# Patient Record
Sex: MALE | Race: WHITE | NOT HISPANIC OR LATINO | Employment: OTHER | URBAN - NONMETROPOLITAN AREA
[De-identification: names, ages, dates, MRNs, and addresses within clinical notes are randomized per-mention and may not be internally consistent; named-entity substitution may affect disease eponyms.]

---

## 2017-02-21 ENCOUNTER — OFFICE VISIT (OUTPATIENT)
Dept: OPHTHALMOLOGY | Facility: CLINIC | Age: 72
End: 2017-02-21

## 2017-02-21 DIAGNOSIS — IMO0002 NUCLEAR CATARACT, RIGHT: Primary | ICD-10-CM

## 2017-02-21 DIAGNOSIS — Z96.1 PSEUDOPHAKIA: ICD-10-CM

## 2017-02-21 DIAGNOSIS — H35.3190 NONEXUDATIVE AGE-RELATED MACULAR DEGENERATION: ICD-10-CM

## 2017-02-21 PROCEDURE — 92004 COMPRE OPH EXAM NEW PT 1/>: CPT | Performed by: OPHTHALMOLOGY

## 2017-02-21 RX ORDER — GABAPENTIN 300 MG/1
CAPSULE ORAL
COMMUNITY
Start: 2017-01-27 | End: 2017-04-24

## 2017-02-21 RX ORDER — HYDROCODONE BITARTRATE AND ACETAMINOPHEN 5; 325 MG/1; MG/1
TABLET ORAL
COMMUNITY
Start: 2017-01-07 | End: 2017-04-24 | Stop reason: ALTCHOICE

## 2017-02-21 RX ORDER — CYCLOBENZAPRINE HCL 5 MG
TABLET ORAL
COMMUNITY
Start: 2017-01-07 | End: 2017-04-24 | Stop reason: DRUGHIGH

## 2017-02-21 RX ORDER — MONTELUKAST SODIUM 10 MG/1
10 TABLET ORAL NIGHTLY
COMMUNITY
Start: 2017-02-13

## 2017-02-21 RX ORDER — HYDROCHLOROTHIAZIDE 25 MG/1
25 TABLET ORAL DAILY
COMMUNITY
Start: 2017-02-13

## 2017-02-21 RX ORDER — TESTOSTERONE CYPIONATE 200 MG/ML
INJECTION, SOLUTION INTRAMUSCULAR
Refills: 5 | Status: ON HOLD | COMMUNITY
Start: 2017-01-18 | End: 2017-05-05 | Stop reason: SDUPTHER

## 2017-02-21 RX ORDER — CYANOCOBALAMIN 1000 UG/ML
INJECTION, SOLUTION INTRAMUSCULAR; SUBCUTANEOUS
Refills: 5 | Status: ON HOLD | COMMUNITY
Start: 2017-02-01 | End: 2017-05-05 | Stop reason: SDUPTHER

## 2017-02-21 RX ORDER — WARFARIN SODIUM 5 MG/1
TABLET ORAL
COMMUNITY
Start: 2017-01-04 | End: 2017-04-24

## 2017-02-21 RX ORDER — LISINOPRIL 5 MG/1
TABLET ORAL
COMMUNITY
Start: 2017-02-13 | End: 2017-04-24

## 2017-02-21 RX ORDER — ATORVASTATIN CALCIUM 40 MG/1
20 TABLET, FILM COATED ORAL NIGHTLY
COMMUNITY
Start: 2017-02-11

## 2017-02-21 NOTE — PROGRESS NOTES
Subjective   John Salas is a 71 y.o. male.   Chief Complaint   Patient presents with   • Eye Exam   • Glaucoma     HPI     Eye Exam   Laterality: both eyes   Quality: blurry vision   Severity: severe           Comments   Hx of decr vision OD for years, ? Glaucoma; CE OS 2015       Last edited by Girma Parish MD on 2/21/2017 11:36 AM. (History)          Review of Systems    Objective   Visual Acuity (Snellen - Linear)      Right Left   Dist cc 20/400 20/25   Near cc  J10       Correction:  Glasses         Wearing Rx      Sphere Cylinder Axis Add   Right -0.50 +1.25 007 +2.50   Left -0.25 +1.25 176 +2.50           Manifest Refraction      Sphere   Right ni   Left ni               Pupils      Pupils   Right PERRL   Left PERRL           Confrontational Visual Fields     Visual Fields      Left Right   Result Full Full                  Extraocular Movement      Right Left   Result Full Full              Tonometry (Applanation, 10:51 AM)      Right Left   Pressure 16 14              Main Ophthalmology Exam     External Exam      Right Left    External Normal Normal      Slit Lamp Exam      Right Left    Lids/Lashes Normal Normal    Conjunctiva/Sclera White and quiet White and quiet    Cornea Clear Clear    Anterior Chamber Deep and quiet Deep and quiet    Iris Round and reactive Round and reactive    Lens 3+ Nuclear sclerosis Posterior chamber intraocular lens    Vitreous Normal Normal      Fundus Exam      Right Left    Disc Normal Normal    Macula Early age related macular degeneration; diff view Early age related macular degeneration    Vessels Normal Normal    Periphery Normal Normal                Assessment/Plan   Diagnoses and all orders for this visit:    Nuclear cataract, right    Nonexudative age-related macular degeneration    Pseudophakia    discussed options, pt desires CE OD, will sched Dr Wagner's eval     copyt to [facility]

## 2017-04-11 ENCOUNTER — PREP FOR SURGERY (OUTPATIENT)
Dept: OPHTHALMOLOGY | Facility: CLINIC | Age: 72
End: 2017-04-11

## 2017-04-11 ENCOUNTER — OFFICE VISIT (OUTPATIENT)
Dept: OPHTHALMOLOGY | Facility: CLINIC | Age: 72
End: 2017-04-11

## 2017-04-11 DIAGNOSIS — Z96.1 PSEUDOPHAKIA: ICD-10-CM

## 2017-04-11 DIAGNOSIS — H26.9 CATARACT: Primary | ICD-10-CM

## 2017-04-11 DIAGNOSIS — H02.836 DERMATOCHALASIS OF BOTH EYELIDS: ICD-10-CM

## 2017-04-11 DIAGNOSIS — H02.403 PTOSIS OF EYELID, BILATERAL: ICD-10-CM

## 2017-04-11 DIAGNOSIS — IMO0002 NUCLEAR CATARACT, RIGHT: Primary | ICD-10-CM

## 2017-04-11 DIAGNOSIS — H02.833 DERMATOCHALASIS OF BOTH EYELIDS: ICD-10-CM

## 2017-04-11 PROCEDURE — 99214 OFFICE O/P EST MOD 30 MIN: CPT | Performed by: OPHTHALMOLOGY

## 2017-04-11 PROCEDURE — 92020 GONIOSCOPY: CPT | Performed by: OPHTHALMOLOGY

## 2017-04-11 RX ORDER — PRAZOSIN HYDROCHLORIDE 1 MG/1
1 CAPSULE ORAL NIGHTLY
COMMUNITY

## 2017-04-11 RX ORDER — SODIUM CHLORIDE 0.9 % (FLUSH) 0.9 %
1-10 SYRINGE (ML) INJECTION AS NEEDED
Status: CANCELLED | OUTPATIENT
Start: 2017-05-05

## 2017-04-11 NOTE — PROGRESS NOTES
Subjective   John Salas is a 71 y.o. male.   Chief Complaint   Patient presents with   • Cataract   • Artificial Lens Present     States vision is good     HPI     Cataract   In right eye.  Associated symptoms include blurred vision.  Severity is severe.  Onset was gradual.  Since onset it is gradually worsening.  Response to treatment was no improvement.           Artificial Lens Present    Additional comments: States vision is good       Last edited by Kevin Wagner MD on 4/11/2017  1:04 PM. (History)          Review of Systems   Constitutional: Negative for chills and fever.   HENT: Negative for sinus pressure.    Respiratory: Negative for shortness of breath.    Cardiovascular: Negative for chest pain.   Endocrine: Negative for polydipsia, polyphagia and polyuria.   Genitourinary: Negative for dysuria.   Musculoskeletal: Negative for back pain.   Neurological: Negative for dizziness.   Psychiatric/Behavioral: Negative for agitation.       Objective   Base Eye Exam     Visual Acuity (Snellen - Linear)      Right Left   Dist cc CF at 3' 20/30 -2       Correction:  Glasses      Tonometry (Applanation, 9:54 AM)      Right Left   Pressure 14 14         Gonioscopy      Right Left   Temporal SS SS   Nasal SS SS   Superior SS SS   Inferior SS SS         Pupils      Pupils   Right PERRL   Left PERRL         Visual Fields      Left Right   Result Full Full         Extraocular Movement      Right Left   Result Full Full         Dilation     Both eyes:  1.0% Mydriacyl @ 9:33 AM            Additional Tests     Potential Acuity Meter      Right Left   ALYSHA 20/200 20/30               Slit Lamp and Fundus Exam     External Exam      Right Left    External Normal Normal      Slit Lamp Exam      Right Left    Lids/Lashes Dermatochalasis - upper lid, Ptosis Dermatochalasis - upper lid, Ptosis    Conjunctiva/Sclera White and quiet White and quiet    Cornea Clear Clear    Anterior Chamber Deep and quiet Deep and quiet     Iris Round and reactive Round and reactive    Lens Nuclear sclerosis, brunescent, ok red reflex dilated to 5.5 mm with one set of drops,, may need Malyugin Posterior chamber intraocular lens (3 piece lens)    Vitreous Normal Normal      Fundus Exam      Right Left    Disc Normal Normal    C/D Ratio 0.15 0.15    Macula Normal Normal    Vessels Normal Normal    Periphery Normal, NO RT or RD, inferior retinal degenration  and also two 1/6 DD spots of hyperpigmentation Normal, no RT or RD, inferior retinal degeneration            Refraction     Wearing Rx      Sphere Cylinder Axis Add   Right -0.50 +1.25 005 +2.50   Left -0.25 +1.50 177 +2.50                   Assessment/Plan   Diagnoses and all orders for this visit:    Cataract  Comments:  Brunescent cataract risks benefits and alternatives discussed. Will schedule for surgery May 5th. Left eye with a three piece lens in the bag.     Dermatochalasis of both eyelids  Comments:  Patient will like to consider surgery of both eyelids explained to patient that this would be best undertaken after cataract extraction of the right eye    Ptosis of eyelid, bilateral  Comments:  Will have patient return after post op 1 visit for measurements.    Pseudophakia  Comments:  Of the left eye, vision is good continue to observe.    Plan:       No Follow-up on file.

## 2017-04-24 ENCOUNTER — PREP FOR SURGERY (OUTPATIENT)
Dept: OPHTHALMOLOGY | Facility: CLINIC | Age: 72
End: 2017-04-24

## 2017-04-24 ENCOUNTER — APPOINTMENT (OUTPATIENT)
Dept: PREADMISSION TESTING | Facility: HOSPITAL | Age: 72
End: 2017-04-24

## 2017-04-24 VITALS
WEIGHT: 292 LBS | HEART RATE: 70 BPM | OXYGEN SATURATION: 96 % | HEIGHT: 73 IN | BODY MASS INDEX: 38.7 KG/M2 | RESPIRATION RATE: 20 BRPM | SYSTOLIC BLOOD PRESSURE: 130 MMHG | DIASTOLIC BLOOD PRESSURE: 50 MMHG

## 2017-04-24 DIAGNOSIS — IMO0002 NUCLEAR CATARACT, RIGHT: Primary | ICD-10-CM

## 2017-04-24 RX ORDER — HYDROCODONE BITARTRATE AND ACETAMINOPHEN 10; 325 MG/1; MG/1
1 TABLET ORAL EVERY 6 HOURS PRN
COMMUNITY

## 2017-04-24 RX ORDER — CYCLOPENTOLATE HYDROCHLORIDE 10 MG/ML
1 SOLUTION/ DROPS OPHTHALMIC
Status: CANCELLED | OUTPATIENT
Start: 2017-04-24 | End: 2017-04-24

## 2017-04-24 RX ORDER — PANTOPRAZOLE SODIUM 40 MG/1
40 TABLET, DELAYED RELEASE ORAL DAILY
COMMUNITY

## 2017-04-24 RX ORDER — ERGOCALCIFEROL 1.25 MG/1
50000 CAPSULE ORAL WEEKLY
COMMUNITY

## 2017-04-24 RX ORDER — PHENOL 1.4 %
600 AEROSOL, SPRAY (ML) MUCOUS MEMBRANE DAILY
COMMUNITY

## 2017-04-24 RX ORDER — PHENYLEPHRINE HCL 2.5 %
1 DROPS OPHTHALMIC (EYE)
Status: CANCELLED | OUTPATIENT
Start: 2017-04-24 | End: 2017-04-24

## 2017-04-24 RX ORDER — SODIUM CHLORIDE, SODIUM GLUCONATE, SODIUM ACETATE, POTASSIUM CHLORIDE, AND MAGNESIUM CHLORIDE 526; 502; 368; 37; 30 MG/100ML; MG/100ML; MG/100ML; MG/100ML; MG/100ML
1000 INJECTION, SOLUTION INTRAVENOUS CONTINUOUS PRN
Status: CANCELLED | OUTPATIENT
Start: 2017-05-05

## 2017-04-24 RX ORDER — NAPROXEN 500 MG/1
500 TABLET ORAL 2 TIMES DAILY WITH MEALS
COMMUNITY

## 2017-04-24 RX ORDER — TROPICAMIDE 10 MG/ML
1 SOLUTION/ DROPS OPHTHALMIC
Status: CANCELLED | OUTPATIENT
Start: 2017-04-24 | End: 2017-04-24

## 2017-04-24 RX ORDER — CYCLOBENZAPRINE HCL 10 MG
10 TABLET ORAL 3 TIMES DAILY PRN
COMMUNITY

## 2017-04-24 RX ORDER — ASCORBIC ACID 500 MG
500 TABLET ORAL DAILY
COMMUNITY

## 2017-04-24 RX ORDER — ALBUTEROL SULFATE 90 UG/1
2 AEROSOL, METERED RESPIRATORY (INHALATION) EVERY 4 HOURS PRN
COMMUNITY

## 2017-04-24 RX ORDER — PROPARACAINE HYDROCHLORIDE 5 MG/ML
1 SOLUTION/ DROPS OPHTHALMIC ONCE
Status: CANCELLED | OUTPATIENT
Start: 2017-04-24 | End: 2017-04-24

## 2017-04-24 RX ORDER — ALBUTEROL SULFATE 2.5 MG/3ML
2.5 SOLUTION RESPIRATORY (INHALATION) EVERY 4 HOURS PRN
COMMUNITY

## 2017-04-24 NOTE — H&P
S: Visually significant cataract of the right eye  O: Blood pressure 124/68 heart rate 72  Alert and oriented ×3 no acute distress  Neck supple  S1-S2 regular rate and rhythm  Lungs clear auscultation bilaterally  Abdomen obese nontender nondistended  Legs 2+ pitting edema bilaterally of lower extremities  Neurological strength equal in both upper and lower extremities  A: Visually significant cataract of the right eye  P: Cataract extraction and IOL placement of the right eye

## 2017-05-04 RX ORDER — PREDNISOLONE ACETATE 10 MG/ML
1 SUSPENSION/ DROPS OPHTHALMIC 4 TIMES DAILY
Status: DISCONTINUED | OUTPATIENT
Start: 2017-05-05 | End: 2017-05-05 | Stop reason: HOSPADM

## 2017-05-04 RX ORDER — KETOROLAC TROMETHAMINE 5 MG/ML
1 SOLUTION OPHTHALMIC 4 TIMES DAILY
Status: DISCONTINUED | OUTPATIENT
Start: 2017-05-05 | End: 2017-05-05 | Stop reason: HOSPADM

## 2017-05-05 ENCOUNTER — PREP FOR SURGERY (OUTPATIENT)
Dept: OPHTHALMOLOGY | Facility: CLINIC | Age: 72
End: 2017-05-05

## 2017-05-05 ENCOUNTER — ANESTHESIA (OUTPATIENT)
Dept: PERIOP | Facility: HOSPITAL | Age: 72
End: 2017-05-05

## 2017-05-05 ENCOUNTER — ANESTHESIA EVENT (OUTPATIENT)
Dept: PERIOP | Facility: HOSPITAL | Age: 72
End: 2017-05-05

## 2017-05-05 ENCOUNTER — HOSPITAL ENCOUNTER (OUTPATIENT)
Facility: HOSPITAL | Age: 72
Setting detail: HOSPITAL OUTPATIENT SURGERY
Discharge: HOME | End: 2017-05-05
Attending: OPHTHALMOLOGY | Admitting: OPHTHALMOLOGY

## 2017-05-05 VITALS
SYSTOLIC BLOOD PRESSURE: 160 MMHG | TEMPERATURE: 97.8 F | HEART RATE: 62 BPM | OXYGEN SATURATION: 95 % | RESPIRATION RATE: 18 BRPM | WEIGHT: 293.87 LBS | BODY MASS INDEX: 38.95 KG/M2 | DIASTOLIC BLOOD PRESSURE: 74 MMHG | HEIGHT: 73 IN

## 2017-05-05 DIAGNOSIS — IMO0002 NUCLEAR CATARACT, RIGHT: ICD-10-CM

## 2017-05-05 DIAGNOSIS — IMO0002 NUCLEAR CATARACT, RIGHT: Primary | ICD-10-CM

## 2017-05-05 PROCEDURE — 25010000002 MIDAZOLAM PER 1 MG: Performed by: NURSE ANESTHETIST, CERTIFIED REGISTERED

## 2017-05-05 PROCEDURE — 25010000002 FENTANYL CITRATE (PF) 100 MCG/2ML SOLUTION: Performed by: NURSE ANESTHETIST, CERTIFIED REGISTERED

## 2017-05-05 PROCEDURE — C1780 LENS, INTRAOCULAR (NEW TECH): HCPCS | Performed by: OPHTHALMOLOGY

## 2017-05-05 PROCEDURE — 66984 XCAPSL CTRC RMVL W/O ECP: CPT | Performed by: OPHTHALMOLOGY

## 2017-05-05 DEVICE — LENS ACRYSOF IQ 6X13MM SN60WF 17.0: Type: IMPLANTABLE DEVICE | Site: EYE | Status: FUNCTIONAL

## 2017-05-05 RX ORDER — PREDNISOLONE ACETATE 10 MG/ML
SUSPENSION/ DROPS OPHTHALMIC AS NEEDED
Status: DISCONTINUED | OUTPATIENT
Start: 2017-05-05 | End: 2017-05-05 | Stop reason: HOSPADM

## 2017-05-05 RX ORDER — BRIMONIDINE TARTRATE 0.15 %
DROPS OPHTHALMIC (EYE) AS NEEDED
Status: DISCONTINUED | OUTPATIENT
Start: 2017-05-05 | End: 2017-05-05 | Stop reason: HOSPADM

## 2017-05-05 RX ORDER — MIDAZOLAM HYDROCHLORIDE 1 MG/ML
INJECTION INTRAMUSCULAR; INTRAVENOUS AS NEEDED
Status: DISCONTINUED | OUTPATIENT
Start: 2017-05-05 | End: 2017-05-05 | Stop reason: SURG

## 2017-05-05 RX ORDER — BRIMONIDINE TARTRATE 2 MG/ML
1 SOLUTION/ DROPS OPHTHALMIC 3 TIMES DAILY
Qty: 10 ML | Refills: 0
Start: 2017-05-05 | End: 2017-05-12

## 2017-05-05 RX ORDER — BALANCED SALT SOLUTION ENRICHED WITH BICARBONATE, DEXTROSE, AND GLUTATHIONE
KIT INTRAOCULAR AS NEEDED
Status: DISCONTINUED | OUTPATIENT
Start: 2017-05-05 | End: 2017-05-05 | Stop reason: HOSPADM

## 2017-05-05 RX ORDER — OFLOXACIN 3 MG/ML
1 SOLUTION/ DROPS OPHTHALMIC 4 TIMES DAILY
Qty: 5 ML | Refills: 0
Start: 2017-05-05 | End: 2017-05-12

## 2017-05-05 RX ORDER — TROPICAMIDE 10 MG/ML
1 SOLUTION/ DROPS OPHTHALMIC
Status: COMPLETED | OUTPATIENT
Start: 2017-05-05 | End: 2017-05-05

## 2017-05-05 RX ORDER — OFLOXACIN 3 MG/ML
1 SOLUTION/ DROPS OPHTHALMIC 4 TIMES DAILY
Status: DISCONTINUED | OUTPATIENT
Start: 2017-05-05 | End: 2017-05-05 | Stop reason: HOSPADM

## 2017-05-05 RX ORDER — KETOROLAC TROMETHAMINE 5 MG/ML
1 SOLUTION OPHTHALMIC 4 TIMES DAILY
Qty: 5 ML | Refills: 0 | Status: SHIPPED | OUTPATIENT
Start: 2017-05-05

## 2017-05-05 RX ORDER — SODIUM CHLORIDE 0.9 % (FLUSH) 0.9 %
1-10 SYRINGE (ML) INJECTION AS NEEDED
Status: DISCONTINUED | OUTPATIENT
Start: 2017-05-05 | End: 2017-05-05 | Stop reason: HOSPADM

## 2017-05-05 RX ORDER — PHENYLEPHRINE HCL 2.5 %
1 DROPS OPHTHALMIC (EYE)
Status: COMPLETED | OUTPATIENT
Start: 2017-05-05 | End: 2017-05-05

## 2017-05-05 RX ORDER — TETRACAINE HYDROCHLORIDE 5 MG/ML
1 SOLUTION OPHTHALMIC ONCE
Status: COMPLETED | OUTPATIENT
Start: 2017-05-05 | End: 2017-05-05

## 2017-05-05 RX ORDER — SODIUM CHLORIDE, SODIUM GLUCONATE, SODIUM ACETATE, POTASSIUM CHLORIDE, AND MAGNESIUM CHLORIDE 526; 502; 368; 37; 30 MG/100ML; MG/100ML; MG/100ML; MG/100ML; MG/100ML
1000 INJECTION, SOLUTION INTRAVENOUS CONTINUOUS PRN
Status: DISCONTINUED | OUTPATIENT
Start: 2017-05-05 | End: 2017-05-05 | Stop reason: HOSPADM

## 2017-05-05 RX ORDER — FENTANYL CITRATE 50 UG/ML
INJECTION, SOLUTION INTRAMUSCULAR; INTRAVENOUS AS NEEDED
Status: DISCONTINUED | OUTPATIENT
Start: 2017-05-05 | End: 2017-05-05 | Stop reason: SURG

## 2017-05-05 RX ORDER — CYCLOPENTOLATE HYDROCHLORIDE 10 MG/ML
1 SOLUTION/ DROPS OPHTHALMIC
Status: COMPLETED | OUTPATIENT
Start: 2017-05-05 | End: 2017-05-05

## 2017-05-05 RX ORDER — BALANCED SALT SOLUTION 6.4; .75; .48; .3; 3.9; 1.7 MG/ML; MG/ML; MG/ML; MG/ML; MG/ML; MG/ML
SOLUTION OPHTHALMIC AS NEEDED
Status: DISCONTINUED | OUTPATIENT
Start: 2017-05-05 | End: 2017-05-05 | Stop reason: HOSPADM

## 2017-05-05 RX ORDER — PREDNISOLONE ACETATE 10 MG/ML
1 SUSPENSION/ DROPS OPHTHALMIC 4 TIMES DAILY
Qty: 5 ML | Refills: 0
Start: 2017-05-05 | End: 2017-05-12 | Stop reason: SDUPTHER

## 2017-05-05 RX ORDER — TETRACAINE HYDROCHLORIDE 5 MG/ML
1 SOLUTION OPHTHALMIC ONCE
Status: CANCELLED | OUTPATIENT
Start: 2017-05-05 | End: 2017-05-05

## 2017-05-05 RX ORDER — TETRACAINE HYDROCHLORIDE 5 MG/ML
SOLUTION OPHTHALMIC AS NEEDED
Status: DISCONTINUED | OUTPATIENT
Start: 2017-05-05 | End: 2017-05-05 | Stop reason: HOSPADM

## 2017-05-05 RX ORDER — MOXIFLOXACIN 5 MG/ML
SOLUTION/ DROPS OPHTHALMIC AS NEEDED
Status: DISCONTINUED | OUTPATIENT
Start: 2017-05-05 | End: 2017-05-05 | Stop reason: HOSPADM

## 2017-05-05 RX ADMIN — PHENYLEPHRINE HYDROCHLORIDE 1 DROP: 25 SOLUTION/ DROPS OPHTHALMIC at 10:51

## 2017-05-05 RX ADMIN — MIDAZOLAM 1 MG: 1 INJECTION INTRAMUSCULAR; INTRAVENOUS at 13:42

## 2017-05-05 RX ADMIN — MIDAZOLAM 1 MG: 1 INJECTION INTRAMUSCULAR; INTRAVENOUS at 13:58

## 2017-05-05 RX ADMIN — TROPICAMIDE 1 DROP: 10 SOLUTION/ DROPS OPHTHALMIC at 10:51

## 2017-05-05 RX ADMIN — TROPICAMIDE 1 DROP: 10 SOLUTION/ DROPS OPHTHALMIC at 10:56

## 2017-05-05 RX ADMIN — CYCLOPENTOLATE HYDROCHLORIDE 1 DROP: 10 SOLUTION/ DROPS OPHTHALMIC at 10:56

## 2017-05-05 RX ADMIN — CYCLOPENTOLATE HYDROCHLORIDE 1 DROP: 10 SOLUTION/ DROPS OPHTHALMIC at 11:01

## 2017-05-05 RX ADMIN — FENTANYL CITRATE 25 MCG: 50 INJECTION, SOLUTION INTRAMUSCULAR; INTRAVENOUS at 13:51

## 2017-05-05 RX ADMIN — CYCLOPENTOLATE HYDROCHLORIDE 1 DROP: 10 SOLUTION/ DROPS OPHTHALMIC at 10:51

## 2017-05-05 RX ADMIN — SODIUM CHLORIDE, SODIUM GLUCONATE, SODIUM ACETATE, POTASSIUM CHLORIDE, AND MAGNESIUM CHLORIDE 1000 ML: 526; 502; 368; 37; 30 INJECTION, SOLUTION INTRAVENOUS at 11:04

## 2017-05-05 RX ADMIN — TROPICAMIDE 1 DROP: 10 SOLUTION/ DROPS OPHTHALMIC at 11:01

## 2017-05-05 RX ADMIN — FENTANYL CITRATE 25 MCG: 50 INJECTION, SOLUTION INTRAMUSCULAR; INTRAVENOUS at 14:24

## 2017-05-05 RX ADMIN — PHENYLEPHRINE HYDROCHLORIDE 1 DROP: 25 SOLUTION/ DROPS OPHTHALMIC at 10:56

## 2017-05-05 RX ADMIN — FENTANYL CITRATE 25 MCG: 50 INJECTION, SOLUTION INTRAMUSCULAR; INTRAVENOUS at 14:00

## 2017-05-05 RX ADMIN — PHENYLEPHRINE HYDROCHLORIDE 1 DROP: 25 SOLUTION/ DROPS OPHTHALMIC at 11:01

## 2017-05-05 RX ADMIN — TETRACAINE HYDROCHLORIDE 1 DROP: 5 SOLUTION OPHTHALMIC at 10:51

## 2017-05-12 ENCOUNTER — OFFICE VISIT (OUTPATIENT)
Dept: OPHTHALMOLOGY | Facility: CLINIC | Age: 72
End: 2017-05-12

## 2017-05-12 DIAGNOSIS — H59.099: Primary | ICD-10-CM

## 2017-05-12 PROCEDURE — 99024 POSTOP FOLLOW-UP VISIT: CPT | Performed by: OPHTHALMOLOGY

## 2017-05-12 RX ORDER — PREDNISOLONE ACETATE 10 MG/ML
1 SUSPENSION/ DROPS OPHTHALMIC 4 TIMES DAILY
Qty: 10 ML | Refills: 0 | Status: SHIPPED | OUTPATIENT
Start: 2017-05-12 | End: 2017-06-11

## 2017-05-12 RX ORDER — PREDNISOLONE ACETATE 10 MG/ML
1 SUSPENSION/ DROPS OPHTHALMIC 4 TIMES DAILY
Qty: 5 ML | Refills: 0
Start: 2017-05-12

## 2017-05-12 RX ORDER — KETOROLAC TROMETHAMINE 5 MG/ML
1 SOLUTION OPHTHALMIC 4 TIMES DAILY
Qty: 10 ML | Refills: 0 | Status: SHIPPED | OUTPATIENT
Start: 2017-05-12

## 2017-06-21 ENCOUNTER — OFFICE VISIT (OUTPATIENT)
Dept: OPHTHALMOLOGY | Facility: CLINIC | Age: 72
End: 2017-06-21

## 2017-06-21 DIAGNOSIS — H52.4 PRESBYOPIA: ICD-10-CM

## 2017-06-21 DIAGNOSIS — H35.3190 NONEXUDATIVE AGE-RELATED MACULAR DEGENERATION: Primary | ICD-10-CM

## 2017-06-21 DIAGNOSIS — Z96.1 PSEUDOPHAKIA: ICD-10-CM

## 2017-06-21 PROCEDURE — 99024 POSTOP FOLLOW-UP VISIT: CPT | Performed by: OPHTHALMOLOGY

## 2017-06-21 RX ORDER — ANTIOX #8/OM3/DHA/EPA/LUT/ZEAX 250-2.5 MG
1 CAPSULE ORAL 2 TIMES DAILY
Qty: 60 CAPSULE | Refills: 11 | Status: SHIPPED | OUTPATIENT
Start: 2017-06-21 | End: 2017-07-21

## 2017-06-21 NOTE — PROGRESS NOTES
Subjective   John Salas is a 72 y.o. male.   Chief Complaint   Patient presents with   • Post-op Follow-up     S/p CE and IOL placement OD on 5/5/2017. Patient has tapered off of drops. Denies eye pain, flashes or new floaters   • Artificial Lens Present     HPI     Post-op Follow-up   In right eye.  Discomfort includes Negative for pain and itching. Additional comments: S/p CE and IOL placement OD on 5/5/2017. Patient has tapered off of drops. Denies eye pain, flashes or new floaters       Last edited by Kevin Wagner MD on 6/21/2017  2:47 PM. (History)          Review of Systems   Constitutional: Negative for chills and fever.   Respiratory: Negative for shortness of breath.    Cardiovascular: Negative for chest pain.   Gastrointestinal: Negative for abdominal pain.   Genitourinary: Negative for dysuria.   Musculoskeletal: Negative for myalgias.   Psychiatric/Behavioral: Negative for confusion.       The following portions of the patient’s history were reviewed and updated as appropriate: current medications, allergies, past medical and surgical history and social history.       Objective   Base Eye Exam     Visual Acuity (Snellen - Linear)      Right Left   Dist sc 20/100 -1    Dist cc  20/100         Extraocular Movement      Right Left   Result Full Full         Neuro/Psych     Oriented x3:  Yes    Mood/Affect:  Normal      Dilation     Both eyes:  1.0% Mydriacyl @ 1:39 PM            Slit Lamp and Fundus Exam     External Exam      Right Left    External Normal Normal      Slit Lamp Exam      Right Left    Lids/Lashes Dermatochalasis - upper lid, Ptosis Dermatochalasis - upper lid, Ptosis    Conjunctiva/Sclera White and quiet White and quiet    Cornea Clear Clear    Anterior Chamber Deep and quiet Deep and quiet    Iris Round and reactive Round and reactive    Lens Posterior chamber intraocular lens Posterior chamber intraocular lens (3 piece lens)    Vitreous Normal Normal      Fundus Exam       Right Left    Disc Normal Normal    Macula RPE changes RPE changes    Vessels Normal Normal    Periphery Normal             Refraction     Wearing Rx      Sphere Cylinder Axis Add   Right -0.50 +1.25 003 +2.50   Left -0.25 +1.25 177 +2.50         Manifest Refraction      Sphere Cylinder Axis Dist   Right +1.25 Sphere  20/70   Left -0.25 +1.25 172 20/25-2         Final Rx      Sphere Cylinder Axis Add   Right +1.25 Sphere  +2.50   Left -0.25 +1.25 172 +2.50                   OCT Macula:  OD- drusen   OS- drusen       Assessment/Plan   Diagnoses and all orders for this visit:    Nonexudative age-related macular degeneration  Comments:  Explained to patient that this is why his vision in the right eye is not as good as that in his left. AREDS vitamins daily and ASMLER GRID daily.   Orders:  -     multivitamins-minerals (PRESERVISION AREDS 2) capsule capsule; Take 1 capsule by mouth 2 (Two) Times a Day for 30 days.    Pseudophakia  Comments:  Continue to observe.     Presbyopia  Comments:  New Rx given.     Plan:       Return in about 1 year (around 6/21/2018).                            This document has been signed by Kevin Wagner MD on June 21, 2017 2:49 PM

## 2017-07-20 ENCOUNTER — OFFICE VISIT (OUTPATIENT)
Dept: OPHTHALMOLOGY | Facility: CLINIC | Age: 72
End: 2017-07-20

## 2017-07-20 DIAGNOSIS — H02.403 PTOSIS OF EYELID, BILATERAL: Primary | ICD-10-CM

## 2017-07-20 DIAGNOSIS — H02.836 DERMATOCHALASIS OF BOTH EYELIDS: ICD-10-CM

## 2017-07-20 DIAGNOSIS — Z96.1 PSEUDOPHAKIA: ICD-10-CM

## 2017-07-20 DIAGNOSIS — H02.833 DERMATOCHALASIS OF BOTH EYELIDS: ICD-10-CM

## 2017-07-20 PROCEDURE — 99212 OFFICE O/P EST SF 10 MIN: CPT | Performed by: OPHTHALMOLOGY

## 2017-07-20 NOTE — PROGRESS NOTES
Subjective   John Salas is a 72 y.o. male.   Chief Complaint   Patient presents with   • Ptosis     Here today for measurements. Would like ptosis repair     HPI     Ptosis   In right upper lid and left upper lid.  Severity is moderate.  Disease is chronic.  Duration of years.  Associated signs and symptoms include Negative for trauma, eyelid swelling, unequal pupils, abnormal gait and chin-up position. Additional comments: Here today for measurements. Would like ptosis repair       Last edited by Kevin Wagner MD on 7/20/2017  2:48 PM. (History)          Review of Systems   Constitutional: Negative for chills and fever.   Respiratory: Negative for shortness of breath.    Cardiovascular: Negative for chest pain.   Gastrointestinal: Negative for abdominal pain.   Genitourinary: Negative for dysuria.   Musculoskeletal: Negative for myalgias.   Psychiatric/Behavioral: Negative for confusion.     The following portions of the patient’s history were reviewed and updated as appropriate: current medications, allergies, past medical and surgical history and social history.       Objective   Base Eye Exam     Tonometry (Applanation, 2:49 PM)      Right Left   Pressure 9 9         Pupils      Pupils   Right PERRL   Left PERRL         Extraocular Movement      Right Left   Result Full, Ortho Full, Ortho         Neuro/Psych     Oriented x3:  Yes    Mood/Affect:  Normal            Slit Lamp and Fundus Exam     External Exam      Right Left    External Normal Normal      Slit Lamp Exam      Right Left    Lids/Lashes Dermatochalasis - upper lid, Ptosis see measurements Dermatochalasis - upper lid, Ptosis see measurements    Conjunctiva/Sclera White and quiet White and quiet    Cornea Clear Clear    Anterior Chamber Deep and quiet Deep and quiet    Iris Round and reactive Round and reactive    Lens Posterior chamber intraocular lens Posterior chamber intraocular lens (3 piece lens)    Vitreous Normal Normal    Ptosis  measurements: (mm)    PF    OD07        OS08   MD1 OD+1        OS 0 to -1  LF    OD 15        OS 15  LC    OD 08       OS 07-08              Refraction     Wearing Rx      Sphere Cylinder Axis Add   Right -0.50 +1.25 003 +2.50   Left -0.25 +1.25 177 +2.50         Manifest Refraction      Sphere Cylinder Axis Dist Add   Right +1.25 Sphere  20/200 +2.50   Left -0.25 +1.25 172 20/50 +2.50                   Assessment/Plan   Diagnoses and all orders for this visit:    Ptosis of eyelid, bilateral  Comments:  Based on measurements patient would benefit from ptosis repair.     Dermatochalasis of both eyelids  Comments:  Contributing to the ptosis as well would likely benefit from ptosis repair and blepharoplasty.     Pseudophakia  Comments:  Vision is  stable observe.     Plan:       Return in about 1 year (around 7/20/2018).                            This document has been signed by Kevin Wagner MD on July 20, 2017 2:50 PM

## 2022-06-01 ENCOUNTER — TRANSCRIBE ORDERS (OUTPATIENT)
Dept: SPEECH THERAPY | Facility: HOSPITAL | Age: 77
End: 2022-06-01

## 2022-06-01 DIAGNOSIS — R49.0 DYSPHONIA: Primary | ICD-10-CM

## 2022-08-01 ENCOUNTER — HOSPITAL ENCOUNTER (OUTPATIENT)
Dept: SPEECH THERAPY | Facility: HOSPITAL | Age: 77
Setting detail: THERAPIES SERIES
Discharge: HOME | End: 2022-08-01

## 2022-08-01 DIAGNOSIS — R49.0 DYSPHONIA: ICD-10-CM

## 2022-08-01 PROCEDURE — 92610 EVALUATE SWALLOWING FUNCTION: CPT | Performed by: SPEECH-LANGUAGE PATHOLOGIST

## 2022-08-01 NOTE — THERAPY EVALUATION
Outpatient Speech Language Pathology   Adult Swallow Initial Evaluation  [facility]     Patient Name: John Salas  : 1945  MRN: [id number]  Today's Date: 2022         Visit Date: 2022   Patient Active Problem List   Diagnosis   • Pseudophakia   • Nonexudative age-related macular degeneration   • Nuclear cataract        Past Medical History:   Diagnosis Date   • Agent orange exposure    • Anxiety    • Arthritis    • Cancer (CMS/HCC)     vocal cord   • COPD (chronic obstructive pulmonary disease) (CMS/[facility])    • Depression    • Follicular disorder     of the skin   • Gallstone    • GI bleed    • [place] (hard of hearing)    • Hypertension    • 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   • Memory deficit    • MRSA (methicillin resistant Staphylococcus aureus)    • PE (pulmonary thromboembolism) (CMS/[facility])    • PTSD (post-traumatic stress disorder)    • Sleep apnea     using c-pap   • VRE (vancomycin-resistant Enterococci)         Past Surgical History:   Procedure Laterality Date   • APPENDECTOMY     • ARM WOUND REPAIR / CLOSURE     • CATARACT EXTRACTION W/ INTRAOCULAR LENS IMPLANT Right 2017    Procedure: CATARACT EXTRACTION WITH INTRAOCULAR LENS IMPLANT RIGHT EYE;  Surgeon: Kevin Wagner MD;  Location: [facility];  Service:    • COLONOSCOPY     • ENDOSCOPY     • EYE SURGERY Left    • HERNIA REPAIR Bilateral     and unbilical   • UMBILICAL EXPLORATION      unbilical cord replacement   • VOCAL FOLD LESION EXCISION           Visit Dx:     ICD-10-CM ICD-9-CM   1. Dysphonia  R49.0 784.42            OP SLP Assessment/Plan - 22 1026        SLP Assessment    Functional Problems Swallowing  -EK    Clinical Impression: Swallowing Mild:  -EK    Functional Problems Comment discoordination of the swallow  -EK    Clinical Impression Comments Pt would benefit from skilled ST to addrss dysphagia and need for further diagnostic testing. Pt displayed no  s/s of aspiraiton but pt displays dysphagia. Pt very intentional and careful when eating.  -EK    SLP Diagnosis dysphagia  -EK    Prognosis Good (comment)  -EK    Patient/caregiver participated in establishment of treatment plan and goals Yes  -EK    Patient would benefit from skilled therapy intervention Yes  -EK       SLP Plan    Frequency MBS to be completed before plan established.  -EK    Plan Comments Pt will completed MBS after SLP obtains order and then pt will be treated in [place] which is closer to home.  -EK          User Key  (r) = Recorded By, (t) = Taken By, (c) = Cosigned By    Initials Name Provider Type    EK Lian Vanessa CCC-SLP Speech and Language Pathologist                 SLP Adult Swallow Evaluation     Row Name 08/01/22 1026       Rehab Evaluation    Document Type evaluation  -EK    Subjective Information complains of  hangs in throat, coughs with water/coffee  -EK    Patient/Family/Caregiver Comments/Observations wife present  -EK    Patient Effort good  -EK       General Information    Patient Profile Reviewed yes  -EK    Pertinent History Of Current Problem [facility]. SLP will address dysphagia first as it is primary concern as pt is coughing with liquids. 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 Pt's swallowing has decreased in the last 6 to 8 months avoids most regular textures except chips. Wife cooks soft foods. Both report he strangles and coughs on thin liquids. Weight is stable. MD discussed MBS but no order sent. SLP will request order.  -EK    Current Method of Nutrition thin liquids;soft textures  -EK    Precautions/Limitations, Vision WFL  -EK    Precautions/Limitations, Hearing hearing impairment, bilaterally  cochlear  -EK    Prior Level of Function-Communication WFL  -EK    Plans/Goals Discussed with patient  -EK        Pain    Additional Documentation Pain Scale: Numbers Pre/Post-Treatment (Group)  -EK       Pain Scale: Numbers Pre/Post-Treatment    Pretreatment Pain Rating 0/10 - no pain  -EK    Posttreatment Pain Rating 0/10 - no pain  -EK       Oral Motor Structure and Function    Dentition Assessment natural, present and adequate  -EK       Oral Musculature and Cranial Nerve Assessment    Oral Motor General Assessment WFL  -EK       General Eating/Swallowing Observations    Respiratory Support Currently in Use room air  -EK    Eating/Swallowing Skills self-fed  -EK    Positioning During Eating upright 90 degree;upright in chair  -EK    Utensils Used spoon;cup  -EK    Consistencies Trialed pureed;thin liquids  declined regular solids  -EK       Clinical Swallow Eval    Oral Prep Phase WFL  -EK    Oral Transit WFL  -EK    Oral Residue WFL  -EK    Pharyngeal Phase WFL  -EK    Clinical Swallow Evaluation Summary Swallow evaluation completed; pt with no s/s of aspiration however pt very intentional; takes small sips, double swallows, and does not talk while eating. Both report difficulty with water and coffee, rice, peas, and foods that seperate. SLP discussed need for MBS and will reach out to MD, will completed MBS and then refer to [place] to be closer to home. Dysphonia can be addressed after dysphagia.  -EK       SLP Evaluation Clinical Impression    SLP Swallowing Diagnosis suspected pharyngeal dysphagia  -EK    Functional Impact risk of aspiration/pneumonia;risk of malnutrition;other  -EK    Rehab Potential/Prognosis, Swallowing good, to achieve stated therapy goals  -EK    Swallow Criteria for Skilled Therapeutic Interventions Met demonstrates skilled criteria  -EK       SLP Treatment Clinical Impressions    Care Plan Review evaluation/treatment results reviewed  -EK       Recommendations    Therapy Frequency (Swallow) other (see comments)  MBS- then will establish a plan  -EK    SLP Diet Recommendation regular  textures;thin liquids  -EK    Recommended Precautions and Strategies upright posture during/after eating;small bites of food and sips of liquid;multiple swallows per bite of food;multiple swallows per sip of liquid;alternate between small bites of food and sips of liquid  -EK    Oral Care Recommendations Oral Care BID/PRN  -EK    SLP Rec. for Method of Medication Administration meds whole;with thin liquids;with pudding or applesauce;as tolerated  -EK    Monitor for Signs of Aspiration yes;notify SLP if any concerns  -EK       (LTG) Swallow    (LTG) Swallow pt will complete MBS.  -EK    [place] (Swallow Long Term Goal) independently (over 90% accuracy)  -EK    Progress/Outcomes (Swallow Long Term Goal) new goal  -EK          User Key  (r) = Recorded By, (t) = Taken By, (c) = Cosigned By    Initials Name Provider Type    Lian Dove CCC-SLP Speech and Language Pathologist                               OP SLP Education     Row Name 08/01/22 1026       Education    Barriers to Learning No barriers identified  -EK    Education Provided Described results of evaluation  -EK    Assessed Learning motivation  -EK    Learning Motivation Strong  -EK    Learning Method Explanation;Demonstration  -EK    Teaching Response Verbalized understanding;Demonstrated understanding  -EK    Education Comments SLP educated on need for MBS to address dysphagia and will address dysphonia at a later date.  -EK          User Key  (r) = Recorded By, (t) = Taken By, (c) = Cosigned By    Initials Name Effective Dates    Lian Dove CCC-KEYSHA 06/16/21 -                SLP OP Goals     Row Name 08/01/22 1230          SLP Time Calculation    SLP Goal Re-Cert Due Date 08/30/22  -EK           User Key  (r) = Recorded By, (t) = Taken By, (c) = Cosigned By    Initials Name Provider Type    Lian Dove CCC-SLP Speech and Language Pathologist                       Time Calculation:   SLP  Start Time: 1026  SLP Stop Time: 1108  SLP Time Calculation (min): 42 min  Total Timed Code Minutes- SLP: 42 minute(s)    Therapy Charges for Today     Code Description Service Date Service Provider Modifiers Qty    [id number] HC ST EVAL ORAL PHARYNG SWALLOW 3 8/1/2022 Lian Vanessa CCC-SLP GN 1                   TAYLOR Sandy  8/1/2022

## 2022-08-22 ENCOUNTER — HOSPITAL ENCOUNTER (OUTPATIENT)
Dept: GENERAL RADIOLOGY | Facility: HOSPITAL | Age: 77
Discharge: HOME | End: 2022-08-22
Admitting: PHYSICIAN ASSISTANT

## 2022-08-22 DIAGNOSIS — R49.0 DYSPHONIA: ICD-10-CM

## 2022-08-22 PROCEDURE — 92611 MOTION FLUOROSCOPY/SWALLOW: CPT | Performed by: SPEECH-LANGUAGE PATHOLOGIST

## 2022-08-22 PROCEDURE — 74230 X-RAY XM SWLNG FUNCJ C+: CPT

## 2022-08-22 RX ADMIN — BARIUM SULFATE 20 ML: 960 POWDER, FOR SUSPENSION ORAL at 10:13

## 2022-08-22 NOTE — THERAPY EVALUATION
Speech Language Pathology   [facility] [place]  [facility]       Patient Name: John Slaas  : 1945  MRN: [id number]    Today's Date: 2022      Visit Date: 2022   SPEECH PATHOLOGY MODIFIED BARIUM SWALLOW STUDY     HX: pt has hx of laryngeal CA 20 years ago    Views: Patient seated at 90 degrees in:    x__lateral view    x__A/P    Ability to follow instructions: x__Excellent        __Good          __Fair  __Poor      The following consistencies were given, with symptoms as noted:  Consistency Method Labial Seal Oral Prep AP Transit Premature Spillage Delayed Swallow Reflex Laryngeal Penetration Aspiration Pooling Valleculae Pooling pyriform sinuses   [id number]  cup            pudding  5ml spoon            Cracker 5ml Bite with barium pudding   piecemeal swallow            pt turned to A/P and took a drink of thin liquid.  There appears to be a slight interruption in the barium column from this angle.  The barium diverts around either side of the pharynx and then come back together in the esophagus.      Penetration-Aspiration Scale Rating:     Category Score Descriptions   No penetration or aspiration 1 Contrast does not enter the airway   Penetration 2 Contrast enters the airway, remains above vocal folds; no residue    3 Contrast remains above vocal folds; visible residue remains    4 Contrast contacts vocal folds; no residue    5 Contrast contacts vocal folds; visible residue remains   Aspiration 6 Contrast passes glottis; no subglottic residue visible    7 Contrast passes glottis; visible subglottic residue despite patient's response    8 Contrast passes glottis; visible subglottic residue; absent patient response        Dysphagia Scale Rating:    Dysphagia Rating Scale Explanation   0   Normal swallowing mechanism     1 Minimal Dysphagia- video swallow shows slight deviance from a normal swallow. Patient may report change in sensation during swallow. No change in diet is  "required.     2 Mild Dysphagia- oropharyngeal dysphagia present, which can be managed by specific swallow suggestions. Slight modification in consistency of diet may be indicated.      3 Mild-Moderate Dysphagia- potential for aspiration exists but is diminished by specific swallow techniques and a modified diet. Time for eating is significantly increased; thus supplemental nutrition may be indicated.      4 Moderate Dysphagia- significant potential for aspiration exists. Trace aspiration of one or more consistencies may be seen under videofluoroscopy. Patient may eat certain consistencies by using specific techniques to minimize potential for aspiration and/or facilitate swallowing. Supervision at mealtimes required. May require supplemental nutrition orally or via feeding tube.      5 Moderately Severe Dysphagia- patient aspirates 5% to 10% on one or more consistencies, with potential for aspiration on all consistencies. Potential for aspiration minimized by specific swallow instructions. Cough reflex absent or non-protective. Alterative mode of feeding required to maintain patient's nutritional needs. If pulmonary status is compromised, \"nothing by mouth\" may be indicated.      6 Severe Dysphagia- more than 10% aspiration for all consistencies. \"Nothing by mouth\" recommended.        Recommendation:  __Non-oral nutrition/hydration  __Trial feeding with Speech Pathologist ONLY  x__May have PO nutrition/hydration to include:   __Regular   _x_Mechanical soft   __Soft   __ Puree   x__Thin Liquids   __Nectar thick liquids   __ Honey thick liquid  Liquids by:   __Cup   __Straw   __Spoon  Compensatory Swallow Strategies suggested:   __sit 90 degrees for all PO and ___ minutes after meals/snacks/medication   __small bites   __small sips   __cyclic eating (2 bites/1 sip)   __eat/drink slowly   __chew food well   __empty mouth each time between bites   __swallow __ times after each __bite __sip __at the end of the " meal   __check mouth for pocketing   __supervision at all meals for compliance with compensatory swallow strategies.         Visit Dx:     ICD-10-CM ICD-9-CM   1. 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   • COPD (chronic obstructive pulmonary disease) (HCC)    • Depression    • Follicular disorder     of the skin   • Gallstone    • GI bleed 2002   • [place] (hard of hearing)    • Hypertension    • Malignant hyperthermia due to anesthesia 2006    appendectomy   • Memory deficit    • MRSA (methicillin resistant Staphylococcus aureus)    • PE (pulmonary thromboembolism) ([facility])    • PTSD (post-traumatic stress disorder)    • Sleep apnea     using c-pap   • VRE (vancomycin-resistant Enterococci)         Past Surgical History:   Procedure Laterality Date   • APPENDECTOMY  2006   • ARM WOUND REPAIR / CLOSURE  1963   • CATARACT EXTRACTION W/ INTRAOCULAR LENS IMPLANT Right 5/5/2017    Procedure: CATARACT EXTRACTION WITH INTRAOCULAR LENS IMPLANT RIGHT EYE;  Surgeon: Kevin Wagner MD;  Location: [facility];  Service:    • COLONOSCOPY     • ENDOSCOPY     • EYE SURGERY Left 2016   • HERNIA REPAIR Bilateral     and unbilical   • UMBILICAL EXPLORATION      unbilical cord replacement   • VOCAL FOLD LESION EXCISION  2000                  OP SLP Assessment/Plan - 08/22/22 1033        SLP Assessment    Functional Problems Swallowing  -EC    Impact on Function: Swallowing Risk of malnourishment  -EC    Clinical Impression: Swallowing pharyngeal phase dysphagia  -EC    Functional Problems Comment pt reports difficulty swallowing solids and liquids.  he reports lipoma in the neck that should be removed in october.  -EC    Clinical Impression Comments there is a functional swallow of  solids of and liquids with no aspiration.  pt does report change in sensation with the swallow.  -EC    Prognosis Good (comment)  -EC       SLP Plan    Frequency eval only  -EC          User Key  (r) = Recorded By, (t) = Taken By, (c) = Cosigned By    Initials Name Provider Type    EC Lian Dey CCC-SLP Speech and Language Pathologist                 SLP Adult Swallow Evaluation     Row Name 08/22/22 1033       Rehab Evaluation    Document Type evaluation  -EC    Subjective Information no complaints  -EC    Patient Observations alert;cooperative;agree to therapy  -EC    Patient/Family/Caregiver Comments/Observations seen in radiology  -EC    Patient Effort excellent  -EC       General Information    Patient Profile Reviewed yes  -EC    Pertinent History Of Current Problem has lipoma in the neck that he feels may be affecting his swallow of solids and liqids.  -EC    Current Method of Nutrition soft textures;thin liquids  -EC       Oral Motor Structure and Function    Dentition Assessment upper dentures/partial in place  -EC    Secretion Management WNL/WFL  -EC    Mucosal Quality moist, healthy  -EC       MBS/VFSS    Utensils Used straw;spoon  -EC    Consistencies Trialed regular textures;pureed;thin liquids  -EC       MBS/VFSS Interpretation    Oral Prep Phase impaired oral phase of swallowing  -EC    Oral Transit Phase impaired  -EC    VFSS Summary there is no aspiration of any consistancy.  pt presents with difficulty initiating dry swallow prior to the study.  -EC       Oral Transit Phase    Impaired Oral Transit Phase piecemeal oral transit  -EC    Piecemeal Oral Transit regular textures  -EC       Initiation of Pharyngeal Swallow    Initiation of Pharyngeal Swallow WFL  -EC       Esophageal Phase    Esophageal Phase no impairments  -EC       SLP Evaluation Clinical Impression    SLP Swallowing Diagnosis suspected pharyngeal dysphagia  -EC    Functional Impact risk of malnutrition;risk of  dehydration;risk of aspiration/pneumonia  -EC    Rehab Potential/Prognosis, Swallowing good, to achieve stated therapy goals  -EC    Swallow Criteria for Skilled Therapeutic Interventions Met demonstrates skilled criteria  -EC       Recommendations    Therapy Frequency (Swallow) evaluation only  -EC    SLP Diet Recommendation soft textures;thin liquids  -EC    Recommended Precautions and Strategies alternate between small bites of food and sips of liquid;small bites of food and sips of liquid;multiple swallows per bite of food;multiple swallows per sip of liquid  -EC          User Key  (r) = Recorded By, (t) = Taken By, (c) = Cosigned By    Initials Name Provider Type    Lian Mina CCC-SLP Speech and Language Pathologist                                OP SLP Education     Row Name 08/22/22 1033       Education    Barriers to Learning Hearing deficit  -EC    Action Taken to Address Barriers spoke to wife as well as pt  -EC    Education Provided Described results of evaluation;Patient expressed understanding of evaluation;Family/caregivers expressed understanding of evaluation;Patient participated in establishing goals and treatment plan;Family/caregivers participated in establishing goals and treatment plan;Patient demonstrated recommended strategies;Family/caregivers demonstrated recommended strategies  -EC    Assessed Learning needs;Learning motivation;Learning preferences;Learning readiness  -EC    Learning Motivation Strong  -EC    Learning Method Explanation  -EC    Teaching Response Verbalized understanding  -EC          User Key  (r) = Recorded By, (t) = Taken By, (c) = Cosigned By    Initials Name Effective Dates    Lian Mina CCC-SLP 06/16/21 -                                    Time Calculation:   Untimed Charges  SLP Eval/Re-eval : ST Motion Fluoro Eval Swallow - 00043  [id number] Motion Fluoro Eval Swallow Minutes: 57  Total Minutes  Untimed Charges Total Minutes: 57   Total  Minutes: 57    Therapy Charges for Today     Code Description Service Date Service Provider Modifiers Qty    [id number] HC ST MOTION FLUORO EVAL SWALLOW 4 8/22/2022 Lian Dey, CCC-SLP GN 1                  Lian Dey CCC-SLP  8/22/2022

## (undated) DEVICE — SHEET,DRAPE,53X77,STERILE: Brand: MEDLINE

## (undated) DEVICE — GLV SURG SENSICARE GREEN W/ALOE PF LF 9 STRL

## (undated) DEVICE — SYR LL 3CC

## (undated) DEVICE — Device

## (undated) DEVICE — GLV SURG TRIUMPH LT PF LTX 7.5 STRL

## (undated) DEVICE — GAUZE,SPONGE,4"X4",16PLY,XRAY,STRL,LF: Brand: MEDLINE

## (undated) DEVICE — GLV SURG SENSICARE GREEN W/ALOE PF LF 6.5 STRL

## (undated) DEVICE — GOWN,AURORA,NOREINF,RAGLAN,XL,STERILE: Brand: MEDLINE

## (undated) DEVICE — SOL IRR H2O BTL 1000ML STRL

## (undated) DEVICE — GLV SURG TRIUMPH LT PF LTX 6.5 STRL